# Patient Record
Sex: FEMALE | Employment: UNEMPLOYED | URBAN - METROPOLITAN AREA
[De-identification: names, ages, dates, MRNs, and addresses within clinical notes are randomized per-mention and may not be internally consistent; named-entity substitution may affect disease eponyms.]

---

## 2021-09-08 ENCOUNTER — HOSPITAL ENCOUNTER (EMERGENCY)
Facility: CLINIC | Age: 41
Discharge: HOME | End: 2021-09-09
Attending: EMERGENCY MEDICINE | Admitting: EMERGENCY MEDICINE

## 2021-09-08 DIAGNOSIS — R07.9 CHEST PAIN, UNSPECIFIED TYPE: ICD-10-CM

## 2021-09-08 DIAGNOSIS — O03.9 MISCARRIAGE: ICD-10-CM

## 2021-09-08 LAB
ABO/RH(D): NORMAL
ANTIBODY SCREEN: NEGATIVE
SPECIMEN EXPIRATION DATE: NORMAL

## 2021-09-08 PROCEDURE — 36415 COLL VENOUS BLD VENIPUNCTURE: CPT | Performed by: EMERGENCY MEDICINE

## 2021-09-08 PROCEDURE — 86900 BLOOD TYPING SEROLOGIC ABO: CPT | Performed by: EMERGENCY MEDICINE

## 2021-09-08 PROCEDURE — 93005 ELECTROCARDIOGRAM TRACING: CPT

## 2021-09-08 PROCEDURE — 84702 CHORIONIC GONADOTROPIN TEST: CPT | Performed by: EMERGENCY MEDICINE

## 2021-09-08 PROCEDURE — 85379 FIBRIN DEGRADATION QUANT: CPT | Performed by: EMERGENCY MEDICINE

## 2021-09-08 PROCEDURE — 80053 COMPREHEN METABOLIC PANEL: CPT | Performed by: EMERGENCY MEDICINE

## 2021-09-08 PROCEDURE — 85025 COMPLETE CBC W/AUTO DIFF WBC: CPT | Performed by: EMERGENCY MEDICINE

## 2021-09-08 PROCEDURE — 84484 ASSAY OF TROPONIN QUANT: CPT | Performed by: EMERGENCY MEDICINE

## 2021-09-08 PROCEDURE — 99285 EMERGENCY DEPT VISIT HI MDM: CPT | Mod: 25

## 2021-09-08 PROCEDURE — 84702 CHORIONIC GONADOTROPIN TEST: CPT

## 2021-09-09 ENCOUNTER — APPOINTMENT (OUTPATIENT)
Dept: GENERAL RADIOLOGY | Facility: CLINIC | Age: 41
End: 2021-09-09
Attending: EMERGENCY MEDICINE

## 2021-09-09 VITALS
DIASTOLIC BLOOD PRESSURE: 64 MMHG | OXYGEN SATURATION: 99 % | TEMPERATURE: 98.6 F | HEART RATE: 90 BPM | RESPIRATION RATE: 18 BRPM | SYSTOLIC BLOOD PRESSURE: 120 MMHG

## 2021-09-09 LAB
ALBUMIN SERPL-MCNC: 4.4 G/DL (ref 3.4–5)
ALBUMIN UR-MCNC: NEGATIVE MG/DL
ALP SERPL-CCNC: 62 U/L (ref 40–150)
ALT SERPL W P-5'-P-CCNC: 18 U/L (ref 0–50)
ANION GAP SERPL CALCULATED.3IONS-SCNC: 5 MMOL/L (ref 3–14)
APPEARANCE UR: CLEAR
AST SERPL W P-5'-P-CCNC: 14 U/L (ref 0–45)
ATRIAL RATE - MUSE: 103 BPM
B-HCG FREE SERPL-ACNC: 11.2 IU/L (ref 0–5)
B-HCG SERPL-ACNC: 9 IU/L (ref 0–5)
BASOPHILS # BLD AUTO: 0 10E3/UL (ref 0–0.2)
BASOPHILS NFR BLD AUTO: 0 %
BILIRUB SERPL-MCNC: 0.4 MG/DL (ref 0.2–1.3)
BILIRUB UR QL STRIP: NEGATIVE
BUN SERPL-MCNC: 22 MG/DL (ref 7–30)
CALCIUM SERPL-MCNC: 9.2 MG/DL (ref 8.5–10.1)
CHLORIDE BLD-SCNC: 108 MMOL/L (ref 94–109)
CO2 SERPL-SCNC: 25 MMOL/L (ref 20–32)
COLOR UR AUTO: NORMAL
CREAT SERPL-MCNC: 0.97 MG/DL (ref 0.52–1.04)
D DIMER PPP FEU-MCNC: <0.27 UG/ML FEU (ref 0–0.5)
DIASTOLIC BLOOD PRESSURE - MUSE: NORMAL MMHG
EOSINOPHIL # BLD AUTO: 0.2 10E3/UL (ref 0–0.7)
EOSINOPHIL NFR BLD AUTO: 2 %
ERYTHROCYTE [DISTWIDTH] IN BLOOD BY AUTOMATED COUNT: 13.8 % (ref 10–15)
GFR SERPL CREATININE-BSD FRML MDRD: 73 ML/MIN/1.73M2
GLUCOSE BLD-MCNC: 109 MG/DL (ref 70–99)
GLUCOSE BLDC GLUCOMTR-MCNC: 112 MG/DL (ref 70–99)
GLUCOSE UR STRIP-MCNC: NEGATIVE MG/DL
HCT VFR BLD AUTO: 39.2 % (ref 35–47)
HGB BLD-MCNC: 12.4 G/DL (ref 11.7–15.7)
HGB UR QL STRIP: NEGATIVE
IMM GRANULOCYTES # BLD: 0 10E3/UL
IMM GRANULOCYTES NFR BLD: 0 %
INTERPRETATION ECG - MUSE: NORMAL
KETONES UR STRIP-MCNC: NEGATIVE MG/DL
LEUKOCYTE ESTERASE UR QL STRIP: NEGATIVE
LYMPHOCYTES # BLD AUTO: 1.3 10E3/UL (ref 0.8–5.3)
LYMPHOCYTES NFR BLD AUTO: 15 %
MCH RBC QN AUTO: 25.5 PG (ref 26.5–33)
MCHC RBC AUTO-ENTMCNC: 31.6 G/DL (ref 31.5–36.5)
MCV RBC AUTO: 81 FL (ref 78–100)
MONOCYTES # BLD AUTO: 0.4 10E3/UL (ref 0–1.3)
MONOCYTES NFR BLD AUTO: 5 %
NEUTROPHILS # BLD AUTO: 6.6 10E3/UL (ref 1.6–8.3)
NEUTROPHILS NFR BLD AUTO: 78 %
NITRATE UR QL: NEGATIVE
NRBC # BLD AUTO: 0 10E3/UL
NRBC BLD AUTO-RTO: 0 /100
P AXIS - MUSE: 47 DEGREES
PH UR STRIP: 5.5 [PH] (ref 5–7)
PLATELET # BLD AUTO: 243 10E3/UL (ref 150–450)
POTASSIUM BLD-SCNC: 3.7 MMOL/L (ref 3.4–5.3)
PR INTERVAL - MUSE: 156 MS
PROT SERPL-MCNC: 8.7 G/DL (ref 6.8–8.8)
QRS DURATION - MUSE: 82 MS
QT - MUSE: 348 MS
QTC - MUSE: 455 MS
R AXIS - MUSE: 34 DEGREES
RBC # BLD AUTO: 4.86 10E6/UL (ref 3.8–5.2)
SODIUM SERPL-SCNC: 138 MMOL/L (ref 133–144)
SP GR UR STRIP: 1.01 (ref 1–1.03)
SYSTOLIC BLOOD PRESSURE - MUSE: NORMAL MMHG
T AXIS - MUSE: 31 DEGREES
TROPONIN I SERPL-MCNC: <0.015 UG/L (ref 0–0.04)
UROBILINOGEN UR STRIP-MCNC: NORMAL MG/DL
VENTRICULAR RATE- MUSE: 103 BPM
WBC # BLD AUTO: 8.5 10E3/UL (ref 4–11)

## 2021-09-09 PROCEDURE — 71045 X-RAY EXAM CHEST 1 VIEW: CPT

## 2021-09-09 PROCEDURE — 81003 URINALYSIS AUTO W/O SCOPE: CPT | Performed by: EMERGENCY MEDICINE

## 2021-09-09 ASSESSMENT — ENCOUNTER SYMPTOMS
NAUSEA: 1
SHORTNESS OF BREATH: 1
FEVER: 0
RHINORRHEA: 0
COUGH: 0

## 2021-09-09 NOTE — ED TRIAGE NOTES
Sami speaking. 30 minutes ago started having left side chest pain and shaking. Pain is constant. Feels nauseated. Pt was sitting and having coffee before pain started.  Hx of low blood pressure.

## 2021-09-09 NOTE — ED PROVIDER NOTES
"  History   Chief Complaint:  Chest Pain       The history is provided by a relative. A  was used (Relative).      Susanna Hawkins is a 41 year old female who presents with chest pain. The patient reports that she began experiencing a sudden onset sharp, left-sided chest pain about 2.5 hours ago while drinking a cup of coffee. She says that this sensation occasionally radiates to her neck. It is not a burning sensation and palpation of the chest does not make pain worse. She also endorses nausea and mild shortness of breath. The patient adds that she believes she had a miscarriage 5 days ago, when she noticed significant vaginal bleeding and what appeared to be a \"sack\" in this blood. She currently has three children. The patient denies current vaginal bleeding, abdominal pain, fever, cough, runny nose, history of similar pain, recent long travel, or history of birth control / hormone use. She does mention that she has a history episodic low blood pressure. No family history early MI.  She is not COVID vaccinated.    Review of Systems   Constitutional: Negative for fever.   HENT: Negative for rhinorrhea.    Respiratory: Positive for shortness of breath. Negative for cough.    Cardiovascular: Positive for chest pain.   Gastrointestinal: Positive for nausea.   All other systems reviewed and are negative.      Allergies:  No known allergies    Medications:  No known current medications    Past Medical History:    No known past medical history    Past Surgical History:    No known past surgical history    Family History:    No known family history    Social History:  Denies smoking or alcohol use  Accompanied by a \"distant relative\"     Physical Exam     Patient Vitals for the past 24 hrs:   BP Temp Temp src Pulse Resp SpO2   09/09/21 0130 120/64 -- -- 90 -- 99 %   09/09/21 0115 117/64 -- -- 90 -- 100 %   09/09/21 0100 120/68 -- -- 85 -- 99 %   09/09/21 0045 115/59 -- -- 88 -- 100 %   09/09/21 " 0030 -- -- -- -- -- 98 %   09/09/21 0000 130/72 -- -- 92 -- 100 %   09/08/21 2148 134/84 98.6  F (37  C) Temporal 92 18 100 %       Physical Exam  Nursing note and vitals reviewed.  Constitutional: Well nourished.   Eyes: Conjunctiva normal.  Pupils are equal, round, and reactive to light.   ENT: Nose normal. Mucous membranes pink and moist.    Neck: Normal range of motion.  CVS: Normal rate, regular rhythm.  Normal heart sounds.  No murmur.  Pulmonary: Lungs clear to auscultation bilaterally. No wheezes/rales/rhonchi.  GI: Abdomen soft. Nontender, nondistended. No rigidity or guarding.    Pelvic: deferred  MSK: No calf tenderness or swelling.  Neuro: Alert. Follows simple commands.  Skin: Skin is warm and dry. No rash noted.   Psychiatric: Normal affect.     Emergency Department Course   ECG  ECG taken at 2140, ECG read at 2140  Sinus tachycardia   Otherwise normal ECG    Rate 103 bpm. CT interval 156 ms. QRS duration 82 ms. QT/QTc 348/455 ms. P-R-T axes 47 34 31.     Imaging:    XR Chest Port 1 View  Negative chest.    Read per radiology     Laboratory:    ISTAT quantitative pregnancy: 11.2 (H)    Glucose by meter: 112 (H)    CMP: glucose 109 (H) o/w WNL (Creatinine 0.97)     Troponin (Collected 2348): <0.015    Ddimer: <0.27    HCG quantitative: 9 (H)    ABO RH Type and Screen: A positive, antibody negative     CBC: WBC 8.5, HGB 12.4,      UA with microscopic: AWNL     Emergency Department Course:    Reviewed:  I reviewed nursing notes, vitals and past medical history    Assessments:  2333 I obtained history and examined the patient as noted above.   0151 I rechecked the patient and explained findings.     Disposition:  The patient was discharged to home.     Impression & Plan     Medical Decision Making:  Patient is a 41-year-old female presenting with reported chest pain.  She is nontoxic, well-hydrated on arrival in no significant distress.  EKG without STEMI or ischemic changes.  Screening troponin  negative.  She has a low heart score and clinically have a lower suspicion for ACS.  I did offer repeat troponin though she is declining.  She expressed understanding of missed or delayed diagnoses.  D-dimer negative, clinically doubt PE.  Chest x-ray without focal pneumonia, fluid overload, widened mediastinum or pneumothorax.  She has no abdominal tenderness and I doubt intra-abdominal source to explain her pain.  The remainder of her labs are overall reassuring without profound anemia or significant electrolyte derangement.  Patient did report an history that she suspect she had a miscarriage a few days ago.  Her beta hCG is trace positive.  She is not a RhoGam candidate.  She denies any active bleeding on exam or abdominal pain.  We did discuss obtaining a formal pelvic ultrasound to ensure no retained products of conception though she is wishing to defer this at this point in time to.  I will provide patient with PCP as well as OB/GYN for follow-up.  I did offer formal  though patient's uncle served as  at bedside.  He expressed understanding as did patient of need for close outpatient follow-up for trending patient's pregnancy hormone as well as consideration for outpatient ultrasound.  We did discuss that should patient's chest pain persist or change she may benefit from outpatient stress test.      Diagnosis:    ICD-10-CM    1. Chest pain, unspecified type  R07.9    2. Miscarriage  O03.9     presumed       Scribe Disclosure:  I, Yunior Dumont, am serving as a scribe at 11:33 PM on 9/8/2021 to document services personally performed by Sandrita Oliver DO based on my observations and the provider's statements to me.              Sandrita Oliver DO  09/09/21 0222